# Patient Record
Sex: FEMALE | Race: WHITE | NOT HISPANIC OR LATINO | Employment: UNEMPLOYED | URBAN - METROPOLITAN AREA
[De-identification: names, ages, dates, MRNs, and addresses within clinical notes are randomized per-mention and may not be internally consistent; named-entity substitution may affect disease eponyms.]

---

## 2017-04-05 ENCOUNTER — LAB VISIT (OUTPATIENT)
Dept: LAB | Facility: HOSPITAL | Age: 22
End: 2017-04-05
Attending: OBSTETRICS & GYNECOLOGY
Payer: MEDICAID

## 2017-04-05 ENCOUNTER — TELEPHONE (OUTPATIENT)
Dept: OBSTETRICS AND GYNECOLOGY | Facility: CLINIC | Age: 22
End: 2017-04-05

## 2017-04-05 ENCOUNTER — INITIAL PRENATAL (OUTPATIENT)
Dept: OBSTETRICS AND GYNECOLOGY | Facility: CLINIC | Age: 22
End: 2017-04-05
Payer: MEDICAID

## 2017-04-05 VITALS — WEIGHT: 142.44 LBS | DIASTOLIC BLOOD PRESSURE: 62 MMHG | HEART RATE: 60 BPM | SYSTOLIC BLOOD PRESSURE: 115 MMHG

## 2017-04-05 DIAGNOSIS — Z34.91 PREGNANCY WITH ONE FETUS, FIRST TRIMESTER: Primary | ICD-10-CM

## 2017-04-05 DIAGNOSIS — Z34.91 PREGNANCY WITH ONE FETUS, FIRST TRIMESTER: ICD-10-CM

## 2017-04-05 DIAGNOSIS — Z36.89 ENCOUNTER TO ESTABLISH GESTATIONAL AGE USING ULTRASOUND: ICD-10-CM

## 2017-04-05 LAB
ABO + RH BLD: NORMAL
ALBUMIN SERPL BCP-MCNC: 3.9 G/DL
ALP SERPL-CCNC: 63 U/L
ALT SERPL W/O P-5'-P-CCNC: 21 U/L
AMPHET+METHAMPHET UR QL: NEGATIVE
ANION GAP SERPL CALC-SCNC: 7 MMOL/L
AST SERPL-CCNC: 15 U/L
BARBITURATES UR QL SCN>200 NG/ML: NEGATIVE
BASOPHILS # BLD AUTO: 0.02 K/UL
BASOPHILS NFR BLD: 0.2 %
BENZODIAZ UR QL SCN>200 NG/ML: NEGATIVE
BILIRUB SERPL-MCNC: 0.3 MG/DL
BLD GP AB SCN CELLS X3 SERPL QL: NORMAL
BUN SERPL-MCNC: 8 MG/DL
BZE UR QL SCN: NEGATIVE
CALCIUM SERPL-MCNC: 9.3 MG/DL
CANNABINOIDS UR QL SCN: NEGATIVE
CHLORIDE SERPL-SCNC: 105 MMOL/L
CO2 SERPL-SCNC: 26 MMOL/L
CREAT SERPL-MCNC: 0.8 MG/DL
CREAT UR-MCNC: 116.5 MG/DL
DIFFERENTIAL METHOD: NORMAL
EOSINOPHIL # BLD AUTO: 0.2 K/UL
EOSINOPHIL NFR BLD: 1.8 %
ERYTHROCYTE [DISTWIDTH] IN BLOOD BY AUTOMATED COUNT: 13.4 %
EST. GFR  (AFRICAN AMERICAN): >60 ML/MIN/1.73 M^2
EST. GFR  (NON AFRICAN AMERICAN): >60 ML/MIN/1.73 M^2
GLUCOSE SERPL-MCNC: 86 MG/DL
HCT VFR BLD AUTO: 38.6 %
HGB BLD-MCNC: 12.7 G/DL
LYMPHOCYTES # BLD AUTO: 2.1 K/UL
LYMPHOCYTES NFR BLD: 23.3 %
MCH RBC QN AUTO: 29.1 PG
MCHC RBC AUTO-ENTMCNC: 32.9 %
MCV RBC AUTO: 89 FL
METHADONE UR QL SCN>300 NG/ML: NEGATIVE
MONOCYTES # BLD AUTO: 0.5 K/UL
MONOCYTES NFR BLD: 5.9 %
NEUTROPHILS # BLD AUTO: 6.1 K/UL
NEUTROPHILS NFR BLD: 68.8 %
OPIATES UR QL SCN: NEGATIVE
PCP UR QL SCN>25 NG/ML: NEGATIVE
PLATELET # BLD AUTO: 276 K/UL
PMV BLD AUTO: 9.8 FL
POTASSIUM SERPL-SCNC: 3.8 MMOL/L
PROT SERPL-MCNC: 7.6 G/DL
RBC # BLD AUTO: 4.36 M/UL
SODIUM SERPL-SCNC: 138 MMOL/L
TOXICOLOGY INFORMATION: NORMAL
WBC # BLD AUTO: 8.84 K/UL

## 2017-04-05 PROCEDURE — 81220 CFTR GENE COM VARIANTS: CPT

## 2017-04-05 PROCEDURE — 85025 COMPLETE CBC W/AUTO DIFF WBC: CPT

## 2017-04-05 PROCEDURE — 99999 PR PBB SHADOW E&M-NEW PATIENT-LVL III: CPT | Mod: PBBFAC,,, | Performed by: OBSTETRICS & GYNECOLOGY

## 2017-04-05 PROCEDURE — 86803 HEPATITIS C AB TEST: CPT

## 2017-04-05 PROCEDURE — 83036 HEMOGLOBIN GLYCOSYLATED A1C: CPT

## 2017-04-05 PROCEDURE — 76801 OB US < 14 WKS SINGLE FETUS: CPT | Mod: 26,S$PBB,, | Performed by: OBSTETRICS & GYNECOLOGY

## 2017-04-05 PROCEDURE — 99204 OFFICE O/P NEW MOD 45 MIN: CPT | Mod: TH,25,S$PBB, | Performed by: OBSTETRICS & GYNECOLOGY

## 2017-04-05 PROCEDURE — 86762 RUBELLA ANTIBODY: CPT

## 2017-04-05 PROCEDURE — 86900 BLOOD TYPING SEROLOGIC ABO: CPT

## 2017-04-05 PROCEDURE — 86850 RBC ANTIBODY SCREEN: CPT

## 2017-04-05 PROCEDURE — 36415 COLL VENOUS BLD VENIPUNCTURE: CPT

## 2017-04-05 PROCEDURE — 86703 HIV-1/HIV-2 1 RESULT ANTBDY: CPT

## 2017-04-05 PROCEDURE — 80053 COMPREHEN METABOLIC PANEL: CPT

## 2017-04-05 PROCEDURE — 87340 HEPATITIS B SURFACE AG IA: CPT

## 2017-04-05 PROCEDURE — 86592 SYPHILIS TEST NON-TREP QUAL: CPT

## 2017-04-05 NOTE — MR AVS SNAPSHOT
[facility] [place] [facility] - OB/ GYN  [address]  [place] [address]  Phone: [phone]                  Iliana Ghotra   2017 11:10 AM   Initial Prenatal    Description:  Female : 1995   Provider:  Andriy Bhatt MD   Department:  [facility] - OB/ GYN           Reason for Visit     Initial Prenatal Visit           Diagnoses this Visit        Comments    Encounter to establish gestational age using ultrasound    -  Primary            To Do List           Goals (5 Years of Data)     None      [facility]     [facility] On Call Nurse Care Line -  Assistance  Unless otherwise directed by your provider, please contact [facility], our nurse care line that is available for  assistance.     Registered nurses in the [facility] provide: appointment scheduling, clinical advisement, health education, and other advisory services.  Call: [phone] (toll free)               Medications           Message regarding Medications     Verify the changes and/or additions to your medication regime listed below are the same as discussed with your clinician today.  If any of these changes or additions are incorrect, please notify your healthcare provider.             Verify that the below list of medications is an accurate representation of the medications you are currently taking.  If none reported, the list may be blank. If incorrect, please contact your healthcare provider. Carry this list with you in case of emergency.                Clinical Reference Information           Prenatal Vitals     Enc. Date GA Prenatal Vitals Prenatal Pulse Pain Level Urine Albumin/Glucose Edema Presentation Dilation/Effacement/Station    17  115/62 / 64.6 kg (142 lb 6.7 oz)    Trace / Negative         Your Vitals Were     BP Weight                115/62 64.6 kg (142 lb 6.7 oz)          Allergies as of 2017     No Known Allergies      Immunizations Administered on Date of Encounter - 2017      None      [facility] Sign-Up     Activating your [facility] account is as easy as 1-2-3!     1) Visit [email or web address], select Sign Up Now, enter this activation code and your date of birth, then select Next.  [email or web address]  Expires: 5/20/2017 11:47 AM      2) Create a username and password to use when you visit [facility] in the future and select a security question in case you lose your password and select Next.    3) Enter your e-mail address and click Sign Up!    Additional Information  If you have questions, please e-mail [email or web address] or call [phone] to talk to our [facility] staff. Remember, [facility] is NOT to be used for urgent needs. For medical emergencies, dial 911.         Smoking Cessation     If you would like to quit smoking:   You may be eligible for free services if you are a [place] resident and started smoking cigarettes before September 1, 1988.  Call the Smoking Cessation Trust ([facility]) toll free at [phone] or ([phone] [phone].   Call 8-595-QUIT-NOW if you do not meet the above criteria.   Contact us via email: [email or web address]   View our website for more information: [email or web address]        Language Assistance Services     ATTENTION: Language assistance services are available, free of charge. Please call [phone].      ATENCIÓN: Si habla español, tiene a briscoe disposición servicios gratuitos de asistencia lingüística. Llame al [phone].     [facility] Ý: N?u b?n nói Ti?ng Vi?t, có các d?ch v? h? tr? ngôn ng? mi?n phí dành cho b?n. G?i s? [phone].         [facility] - OB/ GYN complies with applicable Federal civil rights laws and does not discriminate on the basis of race, color, national origin, age, disability, or sex.

## 2017-04-05 NOTE — TELEPHONE ENCOUNTER
----- Message from Michelle Arreaga sent at 4/5/2017  3:40 PM CDT -----  Contact: [phone]  Pt is requesting a call back in regards to a personal matter Please call pt at your earliest convenience.  Thanks  -----------------------------------  4/5/17 @ 1628p  CALL ATTEMPT TO PT UNSUCCESSFUL, MESSAGE LEFT FOR PT TO RETURN CALL TO OFFICE .

## 2017-04-06 ENCOUNTER — TELEPHONE (OUTPATIENT)
Dept: OBSTETRICS AND GYNECOLOGY | Facility: CLINIC | Age: 22
End: 2017-04-06

## 2017-04-06 DIAGNOSIS — A74.9 CHLAMYDIA: Primary | ICD-10-CM

## 2017-04-06 LAB
C TRACH DNA SPEC QL NAA+PROBE: DETECTED
ESTIMATED AVG GLUCOSE: 103 MG/DL
HBA1C MFR BLD HPLC: 5.2 %
HBV SURFACE AG SERPL QL IA: NEGATIVE
HCV AB SERPL QL IA: NEGATIVE
HIV 1+2 AB+HIV1 P24 AG SERPL QL IA: NEGATIVE
N GONORRHOEA DNA SPEC QL NAA+PROBE: NOT DETECTED
RPR SER QL: NORMAL

## 2017-04-06 RX ORDER — AZITHROMYCIN 500 MG/1
1000 TABLET, FILM COATED ORAL ONCE
Qty: 4 TABLET | Refills: 0 | Status: SHIPPED | OUTPATIENT
Start: 2017-04-06 | End: 2017-04-06

## 2017-04-06 NOTE — TELEPHONE ENCOUNTER
Pt wanted to know if there was a paternity test that we knew of that she could take while pregnant. Pt advised that there is one that may be offered but most insurances does not cover it. It is also a test that we do not offer here in OB.

## 2017-04-06 NOTE — PROGRESS NOTES
[facility]  Ambulatory Clinic  Obstetrics & Gynecology    Visit Date:  2017     Chief Complaint:  Establish prenatal care    Dating Criteria:     LMP:  2017  CRISTIAN by LMP:  10/30/2017  CRISTIAN by 8w6d u/s on 2017:  2017     Working CRISTIAN:  2017, by Ultrasound    History of Present Illness:      Iliana Ghotra is a 22 y.o.  at 8w6d gestation by today's u/s here to establish prenatal care.     Pt recent moved to [facility] from [place], [place].    Pt has no major complaints today and denies any vaginal bleeding, leakage of fluid, contractions, or pain.    Pt is in her usual state of health.    Past Medical History:      None      Past Surgical History:     None     Medications:     Prenatal vitamins    Allergies:      NKDA      Obstetric History:      G1, current    Gynecologic History:      Denies recent/active STI  Denies history abnormal Pap     Social History:      Denies tobacco, alcohol or illicit drug use  Current partner is father of baby  Denies domestic abuse     Family History:       Denies congenital anomalies, inherited syndromes, fetal aneuploidy    Review of Systems:      Constitutional:  No fever, fatigue  HENT:  No congestion, hearing changes  Eyes:  No visual disturbance  Respiratory:  No cough, shortness of breath  Cardiovascular:  No chest pain, leg swelling  Breast:  No lump, pain, nipple discharge, redness, skin changes  Gastrointestinal:  No abdominal pain, constipation, blood in stool   Genitourinary:  No dysuria, frequency  Endocrine:  No heat or cold intolerance  Musculoskeletal:  No back pain, arthralgias  Skin:  No rash, jaundice  Neurological:  No dizziness, weakness, headaches  Psychiatric/Behavioral:  No sleep disturbance, dysphoric mood     Physical Exam:     /62  Wt 64.6 kg (142 lb 6.7 oz)  LMP 2017  Pulse 60, Resp rate 16     GENERAL:  NAD. Well-nourished. A&Ox3.  HEENT:  NCAT, EOMI, PERRLA, moist mucus membranes.  Neck supple w/o  masses.  BREAST:  Symmetric, no obvious masses, adenopathy, skin changes or nipple discharge.  LUNGS:  CTA-B.  HEART:  RRR, physiologic heart sounds.  ABDOMEN:  Soft, non-tender. Normoactive BS.  No obvious organomegaly.  Size = dates.    EXT:  Symmetric w/o cramping, claudication, or edema. +2 distal pulses. FROM.  SKIN:  No rashes or bruising.  NEURO:  CN II - XII grossly intact bilaterally. +2 DTR.  PSYCH:  Mood & affect appropriate.       PELVIC:  Female external genitalia w/o any obvious lesions.  Adequate perineal body. Normal urethral meatus. No gross lymphadenopathy.     VAGINA:  Pink, moist, well-rugated.  Good support.  No obvious lesion.  No discharge noted.     CERVIX:  No cervical motion tenderness, discharge, or obvious lesions.  Closed, thick, posterior.  UTERUS:  Small, non-tender, normal contour.  ADNEXA:  No masses or tenderness.    RECTAL:  Declined.  No obvious external lesions.   WET PREP:  Negative    Chaperone present for exam.    Assessment:     22 y.o.  at 8w6d by today's u/s here to establish prenatal care    Plan:    Principles of prenatal care, weight gain goals, dietary/lifestyle modifications, pregnancy care instructions and precautions were discussed in detail.  Pt was provided literature regarding pregnancy, maternity care, and childbirth.  Continue prenatal vitamins.  SAB precautions reviewed.    Initial OB labs today    Dating ultrasound today    We discussed first trimester aneuploidy screening options, pt declined and states she would not terminate the pregnancy for any reasons.    Return in 4 weeks, or sooner as needed.  Go to ED for any emergencies.  All questions answered, pt voiced understanding.      Andriy Bhatt MD

## 2017-04-06 NOTE — TELEPHONE ENCOUNTER
----- Message from Yasmine Olivera sent at 4/6/2017 11:43 AM CDT -----  Contact: self  Pt calling to discuss a personal matter. Please call [phone].

## 2017-04-06 NOTE — TELEPHONE ENCOUNTER
Notes Recorded by Ernestina Burns LPN on 4/6/2017 at 4:32 PM  CALL ATTEMPT TO PT'S HOME PHONE & CELL PHONE UNSUCCESSFUL, MESSAGE LEFT FOR PT TO RETURN CALL TO OFFICE CONCERNING HER RECENT LAB TEST  -------------------------------

## 2017-04-07 DIAGNOSIS — O99.891 ASYMPTOMATIC BACTERIURIA DURING PREGNANCY: Primary | ICD-10-CM

## 2017-04-07 DIAGNOSIS — R82.71 ASYMPTOMATIC BACTERIURIA DURING PREGNANCY: Primary | ICD-10-CM

## 2017-04-07 LAB
BACTERIA UR CULT: NORMAL
BACTERIA UR CULT: NORMAL
RUBV IGG SER-ACNC: 20.7 IU/ML
RUBV IGG SER-IMP: REACTIVE

## 2017-04-07 RX ORDER — NITROFURANTOIN 25; 75 MG/1; MG/1
100 CAPSULE ORAL 2 TIMES DAILY
Qty: 14 CAPSULE | Refills: 0 | Status: SHIPPED | OUTPATIENT
Start: 2017-04-07 | End: 2017-04-14

## 2017-04-07 NOTE — TELEPHONE ENCOUNTER
Notes Recorded by Ernestina Burns LPN on 4/7/2017 at 2:32 PM  SPOKE WITH RAMIRO , INFORMED HER THAT DR MONSON SENT IN MACROBID TO HER PHARMACY FOR A UTI , INSTRUCTED HER TO START TAKING THEM AS SOON AS SHE GET IT AND NOT TO MISS A DOSE, INSTRUCTED PT TO WIPE FROM FRONT TO BACK AND TO ALWAYS WASH HANDS BEFORE AND AFTER USING THE BATHROOM  PT STATED HER UNDERSTANDING

## 2017-04-07 NOTE — TELEPHONE ENCOUNTER
Notes Recorded by Ernestina Burns LPN on 4/7/2017 at 1:17 PM  SPOKE WITH PT, SHE RETURNED CALL TO CLINIC , INFORMED HER THAT DR MONSON RECEIVED HER LAB RESULTS AND IT IS POSITIVE FOR CHLAMYDIA AN STD, INFORMED HER THAT HE HAS SENT AZITHROMYCIN TO HER PHARMACY [facility], ,AND SHE NEEDS TO START THE MEDICATION ASAP,  INFORMED HER THAT SHE NEEDS TO ABSTAIN FROM SEX WITH HER PARTNER AND HE NEEDS TO  GET TESTED AND TREATED ASAP BECAUSE SHE CAN BE REINFECTED AND IT CAN CAUSE HARM TO THE BABY..  ALSO FOLLOW UP APPT MADE FOR 5/5/17 @ 1110AM , TO TEST FOR A CURE, INFORMED OF THE IMPORTANCE OF COMPLYING TO THIS PROCESS TO PROTECT HER AND THE BABY, PT STATED SHE UNDERSTANDS BUT SOUND OF UNCERTAINTY NOTED  ------

## 2017-04-07 NOTE — TELEPHONE ENCOUNTER
----- Message from Татьяна Espinal sent at 4/7/2017  1:47 PM CDT -----  Contact: self  Pt is requesting a rx for yeast following antibiotic treatment [phone]        Thanks

## 2017-04-12 ENCOUNTER — TELEPHONE (OUTPATIENT)
Dept: OBSTETRICS AND GYNECOLOGY | Facility: CLINIC | Age: 22
End: 2017-04-12

## 2017-04-12 NOTE — TELEPHONE ENCOUNTER
----- Message from Corin Downs sent at 4/12/2017  3:42 PM CDT -----  Hey this is Corin from the send out lab. I'm contacting you about the cystic fibrosis mutation panel that was positive. Please reply back confirming you have reviewed this message. Thanks.  ----------------------------------------------------  4/12/17 @ 1618P    CORIN FROM THE LAB STATED SHE SENT , RESULTS OF THE CYSTIC FIBROSIS PANEL,   NOTED PANEL IS RECEIVED AND IN THE PT'S CHART, WILL FORWARD TO DR MONSON FOR EVALUATION.

## 2017-04-13 LAB — CFTR MUT ANL BLD/T: ABNORMAL

## 2017-04-17 ENCOUNTER — TELEPHONE (OUTPATIENT)
Dept: OBSTETRICS AND GYNECOLOGY | Facility: CLINIC | Age: 22
End: 2017-04-17

## 2017-04-17 NOTE — TELEPHONE ENCOUNTER
----- Message from Michelle Arreaga sent at 4/17/2017 11:39 AM CDT -----  Contact: [phone]  Please call pt at the [phone] Please call pt at your earliest convenience.Thanks !

## 2017-04-17 NOTE — TELEPHONE ENCOUNTER
----- Message from аТтьяна Espinal sent at 4/17/2017  9:13 AM CDT -----  Contact: self  Pt is requesting a rx to treat partner for pos chlamydia. Please call pt with advice @ [phone]          Thanks  -----------------------------------------------------------  4/17/17 @ 1035am  CALL ATTEMPT UNSUCCESSFUL, MESSAGE LEFT FOR PT TO RETURN CALL OFFICE CONCERNING HER PARTNER

## 2017-04-17 NOTE — TELEPHONE ENCOUNTER
Spoke with pt. Pt was recently told she has an STD and wanted rx sent out for her partner as well. Pt informed that he would need to be given rx by his own physician. Pt advised to [facility] or any walk in health clinic.

## 2017-05-03 ENCOUNTER — ROUTINE PRENATAL (OUTPATIENT)
Dept: OBSTETRICS AND GYNECOLOGY | Facility: CLINIC | Age: 22
End: 2017-05-03
Payer: MEDICAID

## 2017-05-03 VITALS — WEIGHT: 143.31 LBS | SYSTOLIC BLOOD PRESSURE: 114 MMHG | DIASTOLIC BLOOD PRESSURE: 68 MMHG

## 2017-05-03 DIAGNOSIS — Z34.91 PREGNANCY WITH ONE FETUS, FIRST TRIMESTER: Primary | ICD-10-CM

## 2017-05-03 DIAGNOSIS — G43.009 MIGRAINE WITHOUT AURA AND WITHOUT STATUS MIGRAINOSUS, NOT INTRACTABLE: ICD-10-CM

## 2017-05-03 PROCEDURE — 99212 OFFICE O/P EST SF 10 MIN: CPT | Mod: PBBFAC | Performed by: OBSTETRICS & GYNECOLOGY

## 2017-05-03 PROCEDURE — 99999 PR PBB SHADOW E&M-EST. PATIENT-LVL II: CPT | Mod: PBBFAC,,, | Performed by: OBSTETRICS & GYNECOLOGY

## 2017-05-03 PROCEDURE — 99213 OFFICE O/P EST LOW 20 MIN: CPT | Mod: TH,S$PBB,, | Performed by: OBSTETRICS & GYNECOLOGY

## 2017-05-03 PROCEDURE — 87591 N.GONORRHOEAE DNA AMP PROB: CPT

## 2017-05-03 RX ORDER — BUTALBITAL, ACETAMINOPHEN AND CAFFEINE 50; 325; 40 MG/1; MG/1; MG/1
1 TABLET ORAL EVERY 4 HOURS PRN
Qty: 30 TABLET | Refills: 0 | Status: ON HOLD | OUTPATIENT
Start: 2017-05-03 | End: 2017-06-01

## 2017-05-03 NOTE — PROGRESS NOTES
12w6d here for OB visit.  Pt has no major complaint today.  Pt reports h/o migraine headaches.  Supportive care measures for HA discussed.  Pt requesting trial of Fioricet just in case if tylenol does not work.  Risks, benefits, and alternatives to Fioricet reviewed.  Headache/neuro precautions.  Pt was treated for chlamydia since her last visit.  Gonorrhea/chlamydia test of cure today. Safe sex.  Pt is CF carrier.  Father of baby denies family h/o CF and is of latin ethnicity, encourage paternal CF testing.  Pt declined first trimester aneuploidy screening, and state she would not terminate the pregnancy for any reasons.  Principles of prenatal care and precautions reviewed.  Return 4 wks.  Voiced understanding.

## 2017-05-03 NOTE — MR AVS SNAPSHOT
[facility] [place] - OB/ GYN  [address]  [place] [address]  Phone: [phone]                  Iliana Ghotra   5/3/2017 10:30 AM   Routine Prenatal    Description:  Female : 1995   Provider:  Andriy Bhatt MD   Department:  [facility] - OB/ GYN           Reason for Visit     Routine Prenatal Visit                To Do List           Future Appointments        Provider Department Dept Phone    2017 10:30 AM Andriy Bhatt MD [facility] - OB/ -[phone]      Goals (5 Years of Data)     None      [facility]     [facility] On Call Nurse Care Line -  Assistance  Unless otherwise directed by your provider, please contact [facility], our nurse care line that is available for  assistance.     Registered nurses in the [facility] provide: appointment scheduling, clinical advisement, health education, and other advisory services.  Call: [phone] (toll free)               Medications           Message regarding Medications     Verify the changes and/or additions to your medication regime listed below are the same as discussed with your clinician today.  If any of these changes or additions are incorrect, please notify your healthcare provider.             Verify that the below list of medications is an accurate representation of the medications you are currently taking.  If none reported, the list may be blank. If incorrect, please contact your healthcare provider. Carry this list with you in case of emergency.                Clinical Reference Information           Prenatal Vitals     Enc. Date GA Prenatal Vitals Prenatal Pulse Pain Level Urine Albumin/Glucose Edema Presentation Dilation/Effacement/Station    5/3/17 12w6d 114/68 / 65 kg (143 lb 4.8 oz)    Negative / Negative       17 8w6d 115/62 / 64.6 kg (142 lb 6.7 oz)  / 187 60 0 Trace / Negative None / None  0 / 0      Your Vitals Were     BP Weight Last Period             114 65 kg (143 lb 4.8 oz)  01/23/2017         Allergies as of 5/3/2017     No Known Allergies      Immunizations Administered on Date of Encounter - 5/3/2017     None      [facility] Sign-Up     Activating your [facility] account is as easy as 1-2-3!     1) Visit [email or web address], select Sign Up Now, enter this activation code and your date of birth, then select Next.  [email or web address]  Expires: 5/20/2017 11:47 AM      2) Create a username and password to use when you visit [facility] in the future and select a security question in case you lose your password and select Next.    3) Enter your e-mail address and click Sign Up!    Additional Information  If you have questions, please e-mail [email or web address] or call [phone] to talk to our [facility] staff. Remember, [facility] is NOT to be used for urgent needs. For medical emergencies, dial 911.         Smoking Cessation     If you would like to quit smoking:   You may be eligible for free services if you are a [place] resident and started smoking cigarettes before September 1, 1988.  Call the Smoking Cessation Trust ([facility]) toll free at ([phone] 679-4746 or (927) 611-6981.   Call 6-847-QUIT-NOW if you do not meet the above criteria.   Contact us via email: [email or web address]   View our website for more information: [email or web address]        Language Assistance Services     ATTENTION: Language assistance services are available, free of charge. Please call [phone].      ATENCIÓN: Si habla español, tiene a briscoe disposición servicios gratuitos de asistencia lingüística. Llame al [phone].     CHÚ Ý: N?u b?n nói Ti?ng Vi?t, có các d?ch v? h? tr? ngôn ng? mi?n phí dành cho b?n. G?i s? [phone].         [facility] - OB/ GYN complies with applicable Federal civil rights laws and does not discriminate on the basis of race, color, national origin, age, disability, or sex.

## 2017-05-04 LAB
C TRACH DNA SPEC QL NAA+PROBE: NOT DETECTED
N GONORRHOEA DNA SPEC QL NAA+PROBE: NOT DETECTED

## 2017-05-31 ENCOUNTER — ROUTINE PRENATAL (OUTPATIENT)
Dept: OBSTETRICS AND GYNECOLOGY | Facility: CLINIC | Age: 22
DRG: 779 | End: 2017-05-31
Payer: MEDICAID

## 2017-05-31 ENCOUNTER — HOSPITAL ENCOUNTER (INPATIENT)
Facility: HOSPITAL | Age: 22
LOS: 2 days | Discharge: HOME | DRG: 779 | End: 2017-06-02
Attending: OBSTETRICS & GYNECOLOGY | Admitting: OBSTETRICS & GYNECOLOGY
Payer: MEDICAID

## 2017-05-31 VITALS — WEIGHT: 144.19 LBS | DIASTOLIC BLOOD PRESSURE: 64 MMHG | SYSTOLIC BLOOD PRESSURE: 106 MMHG

## 2017-05-31 DIAGNOSIS — O02.1 FETAL DEMISE BEFORE 20 WEEKS WITH RETENTION OF DEAD FETUS: Primary | ICD-10-CM

## 2017-05-31 DIAGNOSIS — O02.1 FETAL DEMISE BEFORE 20 WEEKS WITH RETENTION OF DEAD FETUS: ICD-10-CM

## 2017-05-31 LAB
ABO + RH BLD: NORMAL
BASOPHILS # BLD AUTO: 0.01 K/UL
BASOPHILS NFR BLD: 0.1 %
BLD GP AB SCN CELLS X3 SERPL QL: NORMAL
DIFFERENTIAL METHOD: ABNORMAL
EOSINOPHIL # BLD AUTO: 0.1 K/UL
EOSINOPHIL NFR BLD: 0.8 %
ERYTHROCYTE [DISTWIDTH] IN BLOOD BY AUTOMATED COUNT: 13.7 %
HCT VFR BLD AUTO: 38.1 %
HGB BLD-MCNC: 13.2 G/DL
LYMPHOCYTES # BLD AUTO: 1.7 K/UL
LYMPHOCYTES NFR BLD: 14.5 %
MCH RBC QN AUTO: 30.5 PG
MCHC RBC AUTO-ENTMCNC: 34.6 %
MCV RBC AUTO: 88 FL
MONOCYTES # BLD AUTO: 0.7 K/UL
MONOCYTES NFR BLD: 6.5 %
NEUTROPHILS # BLD AUTO: 8.9 K/UL
NEUTROPHILS NFR BLD: 78.1 %
PLATELET # BLD AUTO: 264 K/UL
PMV BLD AUTO: 10.6 FL
RBC # BLD AUTO: 4.33 M/UL
WBC # BLD AUTO: 11.36 K/UL

## 2017-05-31 PROCEDURE — 99213 OFFICE O/P EST LOW 20 MIN: CPT | Mod: TH,S$PBB,, | Performed by: OBSTETRICS & GYNECOLOGY

## 2017-05-31 PROCEDURE — 86901 BLOOD TYPING SEROLOGIC RH(D): CPT

## 2017-05-31 PROCEDURE — 85025 COMPLETE CBC W/AUTO DIFF WBC: CPT

## 2017-05-31 PROCEDURE — 63600175 PHARM REV CODE 636 W HCPCS: Performed by: OBSTETRICS & GYNECOLOGY

## 2017-05-31 PROCEDURE — 11000001 HC ACUTE MED/SURG PRIVATE ROOM

## 2017-05-31 PROCEDURE — 72100002 HC LABOR CARE, 1ST 8 HOURS

## 2017-05-31 PROCEDURE — 25000003 PHARM REV CODE 250: Performed by: OBSTETRICS & GYNECOLOGY

## 2017-05-31 PROCEDURE — 99999 PR PBB SHADOW E&M-EST. PATIENT-LVL II: CPT | Mod: PBBFAC,,, | Performed by: OBSTETRICS & GYNECOLOGY

## 2017-05-31 PROCEDURE — 36415 COLL VENOUS BLD VENIPUNCTURE: CPT

## 2017-05-31 PROCEDURE — 63600175 PHARM REV CODE 636 W HCPCS: Performed by: ANESTHESIOLOGY

## 2017-05-31 PROCEDURE — 86900 BLOOD TYPING SEROLOGIC ABO: CPT

## 2017-05-31 RX ORDER — KETOROLAC TROMETHAMINE 30 MG/ML
30 INJECTION, SOLUTION INTRAMUSCULAR; INTRAVENOUS EVERY 6 HOURS
Status: CANCELLED | OUTPATIENT
Start: 2017-05-31 | End: 2017-06-01

## 2017-05-31 RX ORDER — MISOPROSTOL 100 UG/1
600 TABLET ORAL
Status: CANCELLED | OUTPATIENT
Start: 2017-05-31

## 2017-05-31 RX ORDER — SODIUM CHLORIDE, SODIUM LACTATE, POTASSIUM CHLORIDE, CALCIUM CHLORIDE 600; 310; 30; 20 MG/100ML; MG/100ML; MG/100ML; MG/100ML
INJECTION, SOLUTION INTRAVENOUS CONTINUOUS
Status: DISCONTINUED | OUTPATIENT
Start: 2017-05-31 | End: 2017-06-01

## 2017-05-31 RX ORDER — HYDROMORPHONE HCL IN 0.9% NACL 6 MG/30 ML
PATIENT CONTROLLED ANALGESIA SYRINGE INTRAVENOUS CONTINUOUS
Status: DISCONTINUED | OUTPATIENT
Start: 2017-05-31 | End: 2017-06-01

## 2017-05-31 RX ORDER — IBUPROFEN 400 MG/1
800 TABLET ORAL EVERY 8 HOURS
Status: DISCONTINUED | OUTPATIENT
Start: 2017-06-01 | End: 2017-05-31

## 2017-05-31 RX ORDER — KETOROLAC TROMETHAMINE 30 MG/ML
30 INJECTION, SOLUTION INTRAMUSCULAR; INTRAVENOUS EVERY 6 HOURS
Status: DISCONTINUED | OUTPATIENT
Start: 2017-05-31 | End: 2017-05-31

## 2017-05-31 RX ORDER — NALOXONE HCL 0.4 MG/ML
0.02 VIAL (ML) INJECTION
Status: DISCONTINUED | OUTPATIENT
Start: 2017-05-31 | End: 2017-06-01

## 2017-05-31 RX ORDER — SODIUM CHLORIDE, SODIUM LACTATE, POTASSIUM CHLORIDE, CALCIUM CHLORIDE 600; 310; 30; 20 MG/100ML; MG/100ML; MG/100ML; MG/100ML
INJECTION, SOLUTION INTRAVENOUS CONTINUOUS
Status: CANCELLED | OUTPATIENT
Start: 2017-05-31

## 2017-05-31 RX ORDER — MISOPROSTOL 200 UG/1
600 TABLET ORAL
Status: DISCONTINUED | OUTPATIENT
Start: 2017-05-31 | End: 2017-06-01

## 2017-05-31 RX ORDER — ONDANSETRON 4 MG/1
8 TABLET, ORALLY DISINTEGRATING ORAL EVERY 8 HOURS PRN
Status: CANCELLED | OUTPATIENT
Start: 2017-05-31

## 2017-05-31 RX ORDER — HYDROMORPHONE HCL IN 0.9% NACL 6 MG/30 ML
PATIENT CONTROLLED ANALGESIA SYRINGE INTRAVENOUS CONTINUOUS
Status: DISCONTINUED | OUTPATIENT
Start: 2017-05-31 | End: 2017-05-31

## 2017-05-31 RX ORDER — MISOPROSTOL 100 UG/1
400 TABLET ORAL EVERY 4 HOURS PRN
Status: CANCELLED | OUTPATIENT
Start: 2017-05-31

## 2017-05-31 RX ORDER — ONDANSETRON 8 MG/1
8 TABLET, ORALLY DISINTEGRATING ORAL EVERY 8 HOURS PRN
Status: DISCONTINUED | OUTPATIENT
Start: 2017-05-31 | End: 2017-06-01

## 2017-05-31 RX ORDER — SODIUM CHLORIDE 0.9 % (FLUSH) 0.9 %
3 SYRINGE (ML) INJECTION
Status: DISCONTINUED | OUTPATIENT
Start: 2017-05-31 | End: 2017-06-01

## 2017-05-31 RX ORDER — HYDROMORPHONE HYDROCHLORIDE 2 MG/ML
0.2 INJECTION, SOLUTION INTRAMUSCULAR; INTRAVENOUS; SUBCUTANEOUS EVERY 5 MIN PRN
Status: DISCONTINUED | OUTPATIENT
Start: 2017-05-31 | End: 2017-05-31

## 2017-05-31 RX ORDER — IBUPROFEN 200 MG
800 TABLET ORAL EVERY 8 HOURS
Status: CANCELLED | OUTPATIENT
Start: 2017-06-01

## 2017-05-31 RX ORDER — MISOPROSTOL 200 UG/1
400 TABLET ORAL EVERY 4 HOURS PRN
Status: DISCONTINUED | OUTPATIENT
Start: 2017-05-31 | End: 2017-06-01

## 2017-05-31 RX ORDER — NALOXONE HCL 0.4 MG/ML
0.02 VIAL (ML) INJECTION
Status: DISCONTINUED | OUTPATIENT
Start: 2017-05-31 | End: 2017-05-31

## 2017-05-31 RX ORDER — ACETAMINOPHEN 10 MG/ML
1000 INJECTION, SOLUTION INTRAVENOUS EVERY 8 HOURS
Status: DISCONTINUED | OUTPATIENT
Start: 2017-05-31 | End: 2017-06-01

## 2017-05-31 RX ADMIN — MISOPROSTOL 400 MCG: 200 TABLET ORAL at 11:05

## 2017-05-31 RX ADMIN — SODIUM CHLORIDE, SODIUM LACTATE, POTASSIUM CHLORIDE, AND CALCIUM CHLORIDE: .6; .31; .03; .02 INJECTION, SOLUTION INTRAVENOUS at 06:05

## 2017-05-31 RX ADMIN — MISOPROSTOL 400 MCG: 200 TABLET ORAL at 06:05

## 2017-05-31 RX ADMIN — PROMETHAZINE HYDROCHLORIDE 12.5 MG: 25 INJECTION INTRAMUSCULAR; INTRAVENOUS at 09:05

## 2017-05-31 RX ADMIN — ACETAMINOPHEN 1000 MG: 10 INJECTION, SOLUTION INTRAVENOUS at 09:05

## 2017-05-31 RX ADMIN — ONDANSETRON 8 MG: 8 TABLET, ORALLY DISINTEGRATING ORAL at 11:05

## 2017-05-31 RX ADMIN — Medication: at 09:05

## 2017-05-31 NOTE — NURSING
Bereavement info provided and reviewed.Info provided re.[employer]yt[employer] induction,pain relief options and what to expect at delivery.Asked appropriate questions.Verb.understanding

## 2017-05-31 NOTE — PROGRESS NOTES
16w6d here for OB visit.  Pt has no major complaint today.  Denies vaginal bleeding, leakage of fluid, or contractions.  Unfortunately, FHT was not appreciated on today's exam.  Quick look bedside ultrasound showed absent fetal cardiac activity.  Findings of fetal demised discussed, pt informed.  Emotional supportive provided.  Pt did not report anything out of the ordinary other than she went to [employer] past Friday and participated in rides all day.  After the period of grieving, pt was advised on various mgt options for fetal demise, including expectant mgt, induction of labor, vs surgical mgt.  Pt states she would like to proceed with induction of labor.  Risks, benefits, and alternatives to IOL reviewed.  Will send to L&D for ultrasound and induction of labor.  All questions answered, pt voiced understanding.  Significant other and female friend present at bedside.

## 2017-06-01 PROCEDURE — 88305 TISSUE EXAM BY PATHOLOGIST: CPT | Performed by: PATHOLOGY

## 2017-06-01 PROCEDURE — 72200004 HC VAGINAL DELIVERY LEVEL I

## 2017-06-01 PROCEDURE — 3E033VJ INTRODUCTION OF OTHER HORMONE INTO PERIPHERAL VEIN, PERCUTANEOUS APPROACH: ICD-10-PCS | Performed by: OBSTETRICS & GYNECOLOGY

## 2017-06-01 PROCEDURE — 63600175 PHARM REV CODE 636 W HCPCS: Performed by: OBSTETRICS & GYNECOLOGY

## 2017-06-01 PROCEDURE — 11000001 HC ACUTE MED/SURG PRIVATE ROOM

## 2017-06-01 PROCEDURE — 88309 TISSUE EXAM BY PATHOLOGIST: CPT | Mod: 26,,, | Performed by: PATHOLOGY

## 2017-06-01 PROCEDURE — 63600175 PHARM REV CODE 636 W HCPCS: Performed by: ANESTHESIOLOGY

## 2017-06-01 PROCEDURE — 25000003 PHARM REV CODE 250: Performed by: OBSTETRICS & GYNECOLOGY

## 2017-06-01 PROCEDURE — 88305 TISSUE EXAM BY PATHOLOGIST: CPT | Mod: 26,,, | Performed by: PATHOLOGY

## 2017-06-01 RX ORDER — ONDANSETRON 8 MG/1
8 TABLET, ORALLY DISINTEGRATING ORAL EVERY 8 HOURS PRN
Status: DISCONTINUED | OUTPATIENT
Start: 2017-06-01 | End: 2017-06-02 | Stop reason: HOSPADM

## 2017-06-01 RX ORDER — OXYTOCIN/RINGER'S LACTATE 20/1000 ML
41.65 PLASTIC BAG, INJECTION (ML) INTRAVENOUS CONTINUOUS
Status: DISCONTINUED | OUTPATIENT
Start: 2017-06-01 | End: 2017-06-01

## 2017-06-01 RX ORDER — DIPHENHYDRAMINE HCL 25 MG
25 CAPSULE ORAL EVERY 4 HOURS PRN
Status: DISCONTINUED | OUTPATIENT
Start: 2017-06-01 | End: 2017-06-02 | Stop reason: HOSPADM

## 2017-06-01 RX ORDER — ZOLPIDEM TARTRATE 5 MG/1
5 TABLET ORAL NIGHTLY PRN
Status: DISCONTINUED | OUTPATIENT
Start: 2017-06-01 | End: 2017-06-02 | Stop reason: HOSPADM

## 2017-06-01 RX ORDER — OXYCODONE AND ACETAMINOPHEN 10; 325 MG/1; MG/1
1 TABLET ORAL EVERY 4 HOURS PRN
Status: DISCONTINUED | OUTPATIENT
Start: 2017-06-01 | End: 2017-06-02 | Stop reason: HOSPADM

## 2017-06-01 RX ORDER — SIMETHICONE 80 MG
1 TABLET,CHEWABLE ORAL EVERY 6 HOURS PRN
Status: DISCONTINUED | OUTPATIENT
Start: 2017-06-01 | End: 2017-06-02 | Stop reason: HOSPADM

## 2017-06-01 RX ORDER — OXYCODONE AND ACETAMINOPHEN 5; 325 MG/1; MG/1
1 TABLET ORAL EVERY 4 HOURS PRN
Status: DISCONTINUED | OUTPATIENT
Start: 2017-06-01 | End: 2017-06-02 | Stop reason: HOSPADM

## 2017-06-01 RX ORDER — IBUPROFEN 600 MG/1
600 TABLET ORAL EVERY 6 HOURS PRN
Status: DISCONTINUED | OUTPATIENT
Start: 2017-06-01 | End: 2017-06-02 | Stop reason: HOSPADM

## 2017-06-01 RX ORDER — DOCUSATE SODIUM 100 MG/1
200 CAPSULE, LIQUID FILLED ORAL 2 TIMES DAILY PRN
Status: DISCONTINUED | OUTPATIENT
Start: 2017-06-01 | End: 2017-06-02 | Stop reason: HOSPADM

## 2017-06-01 RX ORDER — OXYTOCIN/RINGER'S LACTATE 20/1000 ML
41.65 PLASTIC BAG, INJECTION (ML) INTRAVENOUS CONTINUOUS
Status: ACTIVE | OUTPATIENT
Start: 2017-06-01 | End: 2017-06-01

## 2017-06-01 RX ORDER — HYDROCORTISONE 25 MG/G
CREAM TOPICAL 3 TIMES DAILY PRN
Status: DISCONTINUED | OUTPATIENT
Start: 2017-06-01 | End: 2017-06-02 | Stop reason: HOSPADM

## 2017-06-01 RX ADMIN — Medication: at 03:06

## 2017-06-01 RX ADMIN — OXYCODONE HYDROCHLORIDE AND ACETAMINOPHEN 1 TABLET: 5; 325 TABLET ORAL at 08:06

## 2017-06-01 RX ADMIN — Medication 41.65 MILLI-UNITS/MIN: at 02:06

## 2017-06-01 RX ADMIN — MISOPROSTOL 400 MCG: 200 TABLET ORAL at 05:06

## 2017-06-01 RX ADMIN — PROMETHAZINE HYDROCHLORIDE 12.5 MG: 25 INJECTION INTRAMUSCULAR; INTRAVENOUS at 05:06

## 2017-06-01 RX ADMIN — MISOPROSTOL 600 MCG: 200 TABLET ORAL at 02:06

## 2017-06-01 RX ADMIN — IBUPROFEN 600 MG: 600 TABLET, FILM COATED ORAL at 07:06

## 2017-06-01 RX ADMIN — SODIUM CHLORIDE, SODIUM LACTATE, POTASSIUM CHLORIDE, AND CALCIUM CHLORIDE: .6; .31; .03; .02 INJECTION, SOLUTION INTRAVENOUS at 01:06

## 2017-06-01 NOTE — PROGRESS NOTES
To room for bedside handoff with FLORESITA Borjas RN, pca pump removed, patient appropriate, spouse at bedside.

## 2017-06-01 NOTE — H&P
[facility]    Obstetrics & Gynecology  History & Physical      Patient Name:  Iliana Ghotra  MRN:  [id number]  Admission Date:  2017  Hospital Length of Stay:  1  Attending Physician:  Andriy Bhatt MD     Date:  2017    Principal Problem:  Fetal demise before 20 weeks    Hospital Course:  IUP at 17w0d for induction of labor secondary to fetal demise    Chief Complaint:  Induction of labor    History of Present Illness:      Iliana Ghotra is a 22 y.o.  at 16w6d who presents to L&D for induction of labor secondary to fetal demise.  Fetal demise was diagnosed in clinic earlier this morning.  Pt has no major complaints and denies vaginal bleeding, leakage of fluid, ctx or pain.  Pt did not report anything out of the ordinary other than she went to [employer] this past  and participated in rides all day.  Otherwise, pt antepartum course has been overall uneventful.  Significant other and family at bedside.     Past Medical History:      None       Past Surgical History:      None      Medications:      Prenatal vitamins     Allergies:      NKDA       Obstetric History:       G1, current     Gynecologic History:      Denies recent/active STI  Denies history abnormal Pap     Social History:       Smokes tobacco  Denies alcohol or illicit drug use  Current partner is father of baby  Denies domestic abuse     Family History:       Denies congenital anomalies, inherited syndromes, fetal aneuploidy    Review of Systems:      At least 10 systems reviewed and were negative except as stated in the HPI     Physical Exam:     Temp:  [98 °F (36.7 °C)-98.8 °F (37.1 °C)] 98.8 °F (37.1 °C)  Pulse:  [] 106  Resp:  [14-18] 18  SpO2:  [92 %-99 %] 99 %  BP: (106-117)/(63-72) 115/63    /63   Pulse 106   Temp 98.8 °F (37.1 °C)   Resp 18   Ht 5' (1.524 m)   Wt 64.9 kg (143 lb)   LMP 2017   SpO2 99%   Breastfeeding? No   BMI 27.93 kg/m²      GENERAL:  No  acute distress.  Alert and oriented to person, place and time.  HEENT:  Normocephalic, atraumatic, anicteric, EOMI, moist mucus membranes.  Neck supple without masses.  LUNGS:  Clear to auscultation bilaterally.  HEART:  Regular rate & rhythm with physiologic heart sounds.  ABDOMEN:  Soft, non tender without any guarding, rigidity or rebound. Normoactive bowel sounds.  PELVIC:  Normal female external genitalia without gross lesions, rashes or excoriations. No gross vaginal or cervical lesions.  Adequate pelvis.  Cervix: Cervix closed, thick, high, posterior.  EXTREMITIES:  Symmetric without cramping, claudication or edema LE. +2 distal pulses.  Full range of motion.  SKIN:  No rashes, good turgor & capillary refill.  NEUROLOGIC:  Grossly intact bilaterally. +2 DTR, symmetric.  PSYCH:  Mood & affect appropriate.       Laboratory Data:     Recent Labs  Lab 17  1308   WBC 11.36   RBC 4.33   HGB 13.2   HCT 38.1      MCV 88   MCH 30.5   MCHC 34.6     ABO:  A POS    Ultrasound 2017    FINDINGS: There is a single intrauterine gestation in cephalic presentation.  The placenta is fundal and grade 1.  There is normal amount of amniotic fluid. Cervix is within normal limits and measures 4.3 cm.      Biophysical profile is as follows:    HR: No Heart rate.    BPD:16 weeks 5 days  HC:  15 weeks 5 day      This corresponds to a composite gestational age of 16 weeks and 2 days.    Assessment:     1. 22 y.o.  at 16w6d for induction of labor secondary to fetal demise    Plan:    Admit to L&D.    Discussed with pt in great detail findings of fetal demise, possible causes, and various management options.  Pt would like to proceed with IOL.  Pt declined fetal testing after delivery.  Emotional support provided.      The patient was informed of the risks, benefits, alternatives and possible complications to induction of labor (with cervidil, cytotec, pitocin, and/or downing bulb), vaginal delivery, operative vaginal  delivery,  section, and blood transfusion.  All questions were answered to pt satisfaction.  Pt voiced understanding and acceptance of these risks.  Informed consents were obtained for delivery, labor induction and blood transfusions.    Admit labs    Consult anesthesia for pain control    Social service and  consult       Andriy Bhatt MD

## 2017-06-01 NOTE — TREATMENT PLAN
Dr Bhatt notified that the placenta still has not delivered, just some clots. Orders to give another 400mcg of cytotec.

## 2017-06-01 NOTE — TREATMENT PLAN
Dr Bhatt notified that fetus delivered, placenta still in.  Orders rec'd for 600 cytotec and pitocin.

## 2017-06-01 NOTE — TREATMENT PLAN
Called Dr Bhatt to discuss plan for pain meds.  If pt does not want epidural, we can do PCA.  Will contact anesthesia about this.

## 2017-06-01 NOTE — PROGRESS NOTES
Dr. Bhatt to room to speak with patient and family, spoke to patient and spouse by themselves..Rationale for autopsy discussed at length. Patient and spouse decided not to have any studies done or any tests. All questions answered.

## 2017-06-01 NOTE — TREATMENT PLAN
RADHA notified.  Spoke with Zaid Peñaloza: Referral #[id number]. Ruled out for all donation due to weight and age.

## 2017-06-01 NOTE — CONSULTS
SW met with pt at bedside. Pt stated that she was doing much better than yesterday. Pt and SO at bedside. SW inquired about bereavement resources. Pt stated that she received resources and thought about Body Donation for infant. SW provided information for donation with [facility] and [facility]. Pt stated that she would like to just have infant viewed and that she would just take [facility]. SW voiced understanding. Pt voiced no concerns or questions. SW will assist as needed.

## 2017-06-01 NOTE — L&D DELIVERY NOTE
[facility]   Delivery Summary  Obstetrics & Gynecology       Date of Delivery:  2017        Preoperative Diagnosis:    Fetal demise at 17w0d  Induction of labor    Postoperative Diagnosis:    SameProcedure Performed:    Vaginal delivery    Indication (HPI):     Please see H&P for complete details.  Iliana Ghotra is a 22 y.o.  at 16w6d who admitted to L&D for induction of labor secondary to fetal demise.  Pt was informed of the risks, benefits, alternatives and possible complications to a vaginal delivery.  Informed consent was obtained for delivery and blood transfusion.      Anesthesia:  PCA    EBL:  150 mL with no replacements    Specimens:  Non-viable fetus, placenta    Findings, Infant & Apgars:      Normal appearing male infant, APGAR 1 min: 0, 5 min: 0, 10 min: 0  Weight 2.8 oz (78 g)  No laceration    Complications:  None    Delivery Summary:      Vaginal delivery of non-viable infant.  No nuchal cord.  No laceration.  Perineum intact.  Placenta was retained 4 hours after delivery, s/p 1000 mcg of cytotec rectal and pitocin, and delivered spontaneously intact.  Hemostasis noted.    No sponges were left in the vagina.   Sponge, lap, needle, and instrument counts were correct time two.   Mother in stable condition.   Findings and expectations were discussed with patient/significant other.  Condolences, emotional support.   All questions answered to pt satisfaction and voiced understanding.      Andriy Bhatt MD

## 2017-06-02 VITALS
HEIGHT: 60 IN | BODY MASS INDEX: 28.07 KG/M2 | RESPIRATION RATE: 14 BRPM | WEIGHT: 143 LBS | SYSTOLIC BLOOD PRESSURE: 90 MMHG | HEART RATE: 70 BPM | TEMPERATURE: 99 F | OXYGEN SATURATION: 100 % | DIASTOLIC BLOOD PRESSURE: 50 MMHG

## 2017-06-02 LAB
BASOPHILS # BLD AUTO: 0.03 K/UL
BASOPHILS NFR BLD: 0.3 %
DIFFERENTIAL METHOD: ABNORMAL
EOSINOPHIL # BLD AUTO: 0.2 K/UL
EOSINOPHIL NFR BLD: 2.2 %
ERYTHROCYTE [DISTWIDTH] IN BLOOD BY AUTOMATED COUNT: 13.5 %
HCT VFR BLD AUTO: 30.5 %
HGB BLD-MCNC: 10.3 G/DL
LYMPHOCYTES # BLD AUTO: 3.3 K/UL
LYMPHOCYTES NFR BLD: 30.2 %
MCH RBC QN AUTO: 30.3 PG
MCHC RBC AUTO-ENTMCNC: 33.8 %
MCV RBC AUTO: 90 FL
MONOCYTES # BLD AUTO: 0.7 K/UL
MONOCYTES NFR BLD: 6.6 %
NEUTROPHILS # BLD AUTO: 6.6 K/UL
NEUTROPHILS NFR BLD: 60.4 %
PLATELET # BLD AUTO: 263 K/UL
PMV BLD AUTO: 10.6 FL
RBC # BLD AUTO: 3.4 M/UL
WBC # BLD AUTO: 10.9 K/UL

## 2017-06-02 PROCEDURE — 85025 COMPLETE CBC W/AUTO DIFF WBC: CPT

## 2017-06-02 PROCEDURE — 36415 COLL VENOUS BLD VENIPUNCTURE: CPT

## 2017-06-02 PROCEDURE — 25000003 PHARM REV CODE 250: Performed by: OBSTETRICS & GYNECOLOGY

## 2017-06-02 RX ORDER — OXYCODONE AND ACETAMINOPHEN 5; 325 MG/1; MG/1
1 TABLET ORAL EVERY 4 HOURS PRN
Qty: 15 TABLET | Refills: 0 | Status: SHIPPED | OUTPATIENT
Start: 2017-06-02

## 2017-06-02 RX ORDER — DOCUSATE SODIUM 100 MG/1
100 CAPSULE, LIQUID FILLED ORAL 2 TIMES DAILY PRN
Qty: 60 CAPSULE | Refills: 1 | Status: SHIPPED | OUTPATIENT
Start: 2017-06-02

## 2017-06-02 RX ORDER — IBUPROFEN 600 MG/1
600 TABLET ORAL EVERY 6 HOURS PRN
Qty: 60 TABLET | Refills: 0 | Status: SHIPPED | OUTPATIENT
Start: 2017-06-02

## 2017-06-02 RX ADMIN — IBUPROFEN 600 MG: 600 TABLET, FILM COATED ORAL at 11:06

## 2017-06-02 NOTE — PROGRESS NOTES
"[facility]    Obstetrics & Gynecology  Progress Note      Patient Name:  Iliana Ghotra  MRN:  [id number]  Admission Date:  5/31/2017  Hospital Length of Stay:  2  Attending Physician:  Andriy Bhatt MD    Subjective:     Date:  06/02/2017    Principal Problem:  Fetal demise before 20 weeks with retention of dead fetus    Hospital Course:  Post Partum Day # 1 - s/p vaginal delivery at 17w0d    Patient without major complaints  No acute events overnight  Denies headaches, vision changes, epigastric pain, SOB, CP  Pt reports "doing ok" with good family   Pain well controlled  Lochia less than menses  Breast non-tender, non-engorged  Ambulating, tolerating regular diet, and voiding without diffculty    Objective:     Temp:  [98.2 °F (36.8 °C)-98.9 °F (37.2 °C)] 98.9 °F (37.2 °C)  Pulse:  [66-72] 70  Resp:  [14-18] 14  BP: ()/(50-58) 90/50    BP (!) 90/50   Pulse 70   Temp 98.9 °F (37.2 °C)   Resp 14   Ht 5' (1.524 m)   Wt 64.9 kg (143 lb)   LMP 01/23/2017   SpO2 100%   Breastfeeding? No   BMI 27.93 kg/m²     Clear, marciano urine    GENERAL:  No acute distress. Alert and oriented x 3.   HEENT:  No scleral icterus. Neck supple. Moist mucus membranes.   LUNGS:  Clear to auscultation bilaterally.   HEART:  Regular rate and rhythm with physiologic heart sounds.   ABDOMEN:  Soft, non-tender, non-distended, no guarding, rigidity, or rebound with normoactive bowel sounds.   FUNDUS:  Firm, nontender below the umbilicus.   EXTREMITIES:  No cramping, claudication or edema LE. +2 distal pulses. Symmetric, full range of motion, negative Barrientos.  NEUROLOGIC:  Grossly intact bilaterally. +2 DTR's.   PSYCH:  Mood and affect appropriate. Denies depression, suicidal or homicidal ideations.  PERINEUM:  Intact with light lochia.    Labs:      Recent Results (from the past 336 hour(s))   CBC auto differential    Collection Time: 06/02/17  6:13 AM   Result Value Ref Range    WBC 10.90 3.90 - 12.70 K/uL    " Hemoglobin 10.3 (L) 12.0 - 16.0 g/dL    Hematocrit 30.5 (L) 37.0 - 48.5 %    Platelets 263 150 - 350 K/uL   CBC with Auto Differential    Collection Time: 05/31/17  1:08 PM   Result Value Ref Range    WBC 11.36 3.90 - 12.70 K/uL    Hemoglobin 13.2 12.0 - 16.0 g/dL    Hematocrit 38.1 37.0 - 48.5 %    Platelets 264 150 - 350 K/uL     ABO:  A POS    Assessment:     Post-partum day # 1 - Fetal demise at 17w0d s/p spontaneous vaginal delivery - progressing well    Plan:     Plan for discharge today.     Reviewed discharge medications.  Pt was instructed to avoid driving or operate heavy machinery while taking narcotics or other medications with sedative properties.    Post-partum care instructions and precautions, clinical/delivery findings, and hospital course reviewed with pt prior to discharge.  All of her questions were answered to her satisfaction.  Pt voiced understanding and agrees with discharge.    Return to clinic in 6 weeks for post-partum visit or sooner if any concerns.  Go to ER for any emergencies.      Andriy Bhatt MD

## 2017-06-02 NOTE — NURSING
Written discharge instructions provided and reviewed.Discharged home in stable cond.with significant other in attendance.

## 2017-06-02 NOTE — DISCHARGE SUMMARY
[facility]    Discharge Summary  Obstetrics & Gynecology      Patient Name:  Iliana Ghotra  MRN:  [id number]  Admission Date:  2017  Discharge Date:  2017  Hospital Length of Stay:  2  Attending Physician:  Andriy Bhatt MD  Discharging Physician:  Andriy Bhatt MD  Date of Delivery:  2017    Admitting Diagnosis:       Fetal demise at 16w6d  Induction of labor    Discharge Diagnosis:    Same    Procedure performed:      Spontanous vaginal delivery    Brief Hospital Course:      Please see H&P for complete details.  Iliana Ghotra is a 22 y.o.  at 16w6d who admitted to L&D for induction of labor secondary to fetal demise.  Induction was initiated with cytotec x 2 doses, and and pt delivered ~8 hours.  See delivery note for further details.  Pt declined all fetal testing.  Following delivery, the patient was transfer to the floor for routine post-partum care.  Pt had a fairly uncomplicated postpartum course.  Prior to discharge, she was without major complaints.  Pt pain was well controlled.  Pt was ambulating, tolerating a regular diet, voiding without difficulty.  Pt lochia was light.  Vital signs where physiologic and stable.  Physical exam showed no acute findings.  Pt had satisfied routine postpartum discharge criteria and expressed the desire to be discharge from the hospital.  Again, heartfelt condolences were expressed to family.  Pt reports good family support and was hopeful for the future.     Pertinent Labs or Studies:      Recent Results (from the past 336 hour(s))   CBC auto differential    Collection Time: 17  6:13 AM   Result Value Ref Range    WBC 10.90 3.90 - 12.70 K/uL    Hemoglobin 10.3 (L) 12.0 - 16.0 g/dL    Hematocrit 30.5 (L) 37.0 - 48.5 %    Platelets 263 150 - 350 K/uL   CBC with Auto Differential    Collection Time: 17  1:08 PM   Result Value Ref Range    WBC 11.36 3.90 - 12.70 K/uL    Hemoglobin 13.2 12.0 - 16.0 g/dL    Hematocrit 38.1  37.0 - 48.5 %    Platelets 264 150 - 350 K/uL     A POS    Discharge Exam:      Temp:  [98.2 °F (36.8 °C)-98.9 °F (37.2 °C)] 98.9 °F (37.2 °C)  Pulse:  [66-72] 70  Resp:  [14-18] 14  BP: ()/(50-58) 90/50    BP (!) 90/50   Pulse 70   Temp 98.9 °F (37.2 °C)   Resp 14   Ht 5' (1.524 m)   Wt 64.9 kg (143 lb)   LMP 01/23/2017   SpO2 100%   Breastfeeding? No   BMI 27.93 kg/m²     GENERAL:  No acute distress. Alert and oriented x 3.   HEENT:  Normocephalic. EOMI, PERRLA. No scleral icterus. Neck supple. Moist mucus membranes.   LUNGS:  Clear to auscultation bilaterally.   HEART:  Regular rate and rhythm with physiologic heart sounds.   ABDOMEN:  Soft, non-tender, non-distended, no guarding, rigidity, or rebound.   FUNDUS:  Firm, nontender below the umbilicus.   EXTREMITIES:  No cramping, claudication or edema. Good skin turgor. +2 distal pulses, symmetric. Full range of motion.   NEUROLOGIC:  Grossly intact bilaterally.   PSYCH:  Mood and affect appropriate.   PERINEUM:  Intact with light lochia.    Discharge Condition:  Stable      Discharge Disposition:  Home       Discharge Medications:     Resume prenatal vitamins 1 tab po qday  Fergon 325 mg 1 tab po qday, disp #60, refill 1  Colace 100 mg 1 tab po bid prn constipation, disp #60, refill 1  Motrin 600 mg 1 tab po q6h prn pain, disp #60, refill 0  Percocet 5/325 mg 1 tab po q4-6h prn breakthrough pain, disp #30, refill 0    Discharge Activites:      Resume activities as tolerated with adequate rest  Pelvic rest for 6 weeks   No heavy lifting greater 10 lbs or strenuous activities   Showers only  Avoid driving and operating machinery while taking narcotics or other sedative medications.  Patient voiced understanding.     Discharge Diet:  Regular diet    Discharge Instructions:      Pt was instructed to contact the clinic or emergency department for any concerns including but not limited to an increase in her lochia, abdominal pain, foul vaginal discharge,  weakness, decrease activity level, decrease appetite, feeling sad or hopeless, breast pain or infection, difficulty with voiding or having bowel movements, perineal pain or breakdown, wound breakdown, fever >100.4 or abnormal vital signs, shortness of breath, chest pain, dizziness or feeling faint, pain or swelling in her extremities, headaches not relieved with rest and Tylenol, vision changes, seizure activities, abnormal bleeding/bruising, or any other health concerns.  Post-partum care instructions and precautions, labs/studies, clinical/delivery findings, and hospital course reviewed with the patient prior to discharge.  All of her questions were answered to her satisfaction.  Pt voiced understanding.      Follow-up Visit:  6 weeks for postpartum visit, or sooner if any problems.       Andriy Bhatt MD

## 2017-06-04 ENCOUNTER — NURSE TRIAGE (OUTPATIENT)
Dept: ADMINISTRATIVE | Facility: CLINIC | Age: 22
End: 2017-06-04

## 2017-06-04 ENCOUNTER — HOSPITAL ENCOUNTER (EMERGENCY)
Facility: HOSPITAL | Age: 22
Discharge: HOME | End: 2017-06-04
Attending: EMERGENCY MEDICINE
Payer: MEDICAID

## 2017-06-04 VITALS
DIASTOLIC BLOOD PRESSURE: 74 MMHG | WEIGHT: 143 LBS | SYSTOLIC BLOOD PRESSURE: 118 MMHG | HEIGHT: 60 IN | HEART RATE: 79 BPM | BODY MASS INDEX: 28.07 KG/M2 | TEMPERATURE: 99 F | RESPIRATION RATE: 16 BRPM | OXYGEN SATURATION: 100 %

## 2017-06-04 DIAGNOSIS — M54.50 LOW BACK PAIN WITHOUT SCIATICA, UNSPECIFIED BACK PAIN LATERALITY, UNSPECIFIED CHRONICITY: Primary | ICD-10-CM

## 2017-06-04 LAB
B-HCG UR QL: POSITIVE
BACTERIA GENITAL QL WET PREP: ABNORMAL
BILIRUB UR QL STRIP: NEGATIVE
CLARITY UR: CLEAR
CLUE CELLS VAG QL WET PREP: ABNORMAL
COLOR UR: ABNORMAL
CTP QC/QA: YES
FILAMENT FUNGI VAG WET PREP-#/AREA: ABNORMAL
GLUCOSE UR QL STRIP: NEGATIVE
HGB UR QL STRIP: ABNORMAL
KETONES UR QL STRIP: NEGATIVE
LEUKOCYTE ESTERASE UR QL STRIP: ABNORMAL
MICROSCOPIC COMMENT: NORMAL
NITRITE UR QL STRIP: NEGATIVE
PH UR STRIP: 6 [PH] (ref 5–8)
PROT UR QL STRIP: NEGATIVE
RBC #/AREA URNS HPF: 3 /HPF (ref 0–4)
SP GR UR STRIP: 1.01 (ref 1–1.03)
SPECIMEN SOURCE: ABNORMAL
T VAGINALIS GENITAL QL WET PREP: ABNORMAL
URN SPEC COLLECT METH UR: ABNORMAL
UROBILINOGEN UR STRIP-ACNC: NEGATIVE EU/DL
WBC #/AREA URNS HPF: 5 /HPF (ref 0–5)
WBC #/AREA VAG WET PREP: ABNORMAL
YEAST GENITAL QL WET PREP: ABNORMAL

## 2017-06-04 PROCEDURE — 81025 URINE PREGNANCY TEST: CPT | Performed by: EMERGENCY MEDICINE

## 2017-06-04 PROCEDURE — 87591 N.GONORRHOEAE DNA AMP PROB: CPT

## 2017-06-04 PROCEDURE — 25000003 PHARM REV CODE 250: Performed by: PHYSICIAN ASSISTANT

## 2017-06-04 PROCEDURE — 87210 SMEAR WET MOUNT SALINE/INK: CPT

## 2017-06-04 PROCEDURE — 81000 URINALYSIS NONAUTO W/SCOPE: CPT

## 2017-06-04 PROCEDURE — 99284 EMERGENCY DEPT VISIT MOD MDM: CPT

## 2017-06-04 RX ORDER — OXYCODONE AND ACETAMINOPHEN 10; 325 MG/1; MG/1
1 TABLET ORAL
Status: COMPLETED | OUTPATIENT
Start: 2017-06-04 | End: 2017-06-04

## 2017-06-04 RX ADMIN — OXYCODONE HYDROCHLORIDE AND ACETAMINOPHEN 1 TABLET: 10; 325 TABLET ORAL at 02:06

## 2017-06-04 NOTE — TELEPHONE ENCOUNTER
Reason for Disposition   Unable to walk    Protocols used: ST BACK PAIN-A-AH    Patient complaint of back pain 10/10 not be alleviated by the pain medications she was prescribed after delivery. Also, she has bilateral leg pains that are severe. Patient denies picking up heavy objects and she has been walking around and not doing anything strenuous. Patient advised to go to the ED and she verbalized understanding.

## 2017-06-04 NOTE — DISCHARGE INSTRUCTIONS
Please continue taking Percocet as previous prescribed every 4 hours while continuing to have pain.  Follow-up with Dr. Bhatt as already scheduled.

## 2017-06-04 NOTE — ED PROVIDER NOTES
Encounter Date: 6/4/2017       History     Chief Complaint   Patient presents with    Back Pain     Low back pain since spontaneous AB 2 days ago. Did not have D&C. Still having light vaginal bleeding.     Review of patient's allergies indicates:  No Known Allergies  CC: Vaginal Bleeding, Back Pain   HPI: This 22 y.o. female smoker presents to the ED c/o ongoing vaginal bleeding, severe low abdominal cramping, and severe low back pain s/p miscarriage 3 days ago. Pt states that the bleeding has mildly worsened since the miscarriage. She states that the Percocet she was prescribed has not been helping; no doses taken today. She denies fever, chest pain, SOB, N/V/D.        The history is provided by the patient.     History reviewed. No pertinent past medical history.  History reviewed. No pertinent surgical history.  Family History   Problem Relation Age of Onset    Cancer Mother      Social History   Substance Use Topics    Smoking status: Current Some Day Smoker     Packs/day: 1.00     Types: Cigarettes    Smokeless tobacco: Not on file    Alcohol use No      Comment: prior to pregnancy     Review of Systems   Constitutional: Negative for chills and fever.   HENT: Negative for ear pain and sore throat.    Eyes: Negative for pain and visual disturbance.   Respiratory: Negative for cough and shortness of breath.    Gastrointestinal: Positive for abdominal pain. Negative for diarrhea, nausea and vomiting.   Genitourinary: Positive for vaginal bleeding. Negative for dysuria and flank pain.   Musculoskeletal: Positive for back pain.   Skin: Negative for rash and wound.   Neurological: Negative for syncope and headaches.       Physical Exam     Initial Vitals [06/04/17 1257]   BP Pulse Resp Temp SpO2   118/74 79 16 98.5 °F (36.9 °C) 100 %     Physical Exam    Vitals reviewed.  Constitutional: She appears well-developed and well-nourished. She is not diaphoretic. No distress.   HENT:   Head: Normocephalic and  atraumatic.   Right Ear: External ear normal.   Left Ear: External ear normal.   Nose: Nose normal.   Eyes: Conjunctivae are normal. No scleral icterus.   Neck: Normal range of motion. Neck supple.   Cardiovascular: Normal rate, regular rhythm, normal heart sounds and intact distal pulses.   Pulmonary/Chest: Breath sounds normal. No respiratory distress. She has no wheezes. She has no rhonchi. She has no rales. She exhibits no tenderness.   Abdominal: Soft. Bowel sounds are normal. She exhibits no distension and no mass. There is no tenderness. There is no rebound and no guarding.   Genitourinary: Cervix exhibits no motion tenderness, no discharge and no friability. Right adnexum displays no mass and no tenderness. Left adnexum displays no mass and no tenderness. There is bleeding (scant) in the vagina. No tenderness in the vagina. No signs of injury around the vagina. No vaginal discharge found.   Musculoskeletal: Normal range of motion. She exhibits tenderness (mild lumbar paraspinal tenderness bilaterally).   Lymphadenopathy:     She has no cervical adenopathy.   Neurological: She is alert and oriented to person, place, and time.   Skin: Skin is warm and dry.         ED Course   Procedures  Labs Reviewed   URINALYSIS - Abnormal; Notable for the following:        Result Value    Occult Blood UA 2+ (*)     Leukocytes, UA 1+ (*)     All other components within normal limits   VAGINAL SCREEN - Abnormal; Notable for the following:     Clue Cells, Wet Prep Few (*)     WBC - Vaginal Screen Rare (*)     Bacteria - Vaginal Screen Moderate (*)     All other components within normal limits   POCT URINE PREGNANCY - Abnormal; Notable for the following:     POC Preg Test, Ur Positive (*)     All other components within normal limits   C. TRACHOMATIS/N. GONORRHOEAE BY AMP DNA   URINALYSIS MICROSCOPIC             Medical Decision Making:   Initial Assessment:   22-year-old female with recent fetal demise and spontaneous vaginal  delivery with induction 2 days ago presents with lower back pain and vaginal bleeding.  Patient was a proximally 17 weeks gestation at time of delivery.  OB/GYN discharge patient home with ibuprofen and Percocet.  Patient has not taken either pain medication since yesterday afternoon and complains of lower back pain today.  Her vaginal bleeding has increased slightly over the last 2 days.  She denies fever, dysuria, significant vaginal discharge.  Differential Diagnosis:   Musculoskeletal pain, endometritis, PID, cystitis, pyelonephritis, nephrolithiasis.  ED Management:  Patient presents well-appearing, in no distress, and no obvious discomfort.  She is afebrile with vitals within normal limits.  Abdomen soft and nontender.  No CVA tenderness.  Patient has mild tenderness to bilateral lumbar paraspinal muscles.  No midline tenderness.  Pelvic exam remarkable for scant amount of blood in the vault.  No CMT.  No significant discharge.  UA with no evidence of infection.  Vaginal screen with rare clue cells.  I doubt significant or treatable BV.  I also have low suspicion for PID or cervicitis.  Will follow up gonorrhea and chlamydia.  Patient overall well-appearing and stable for discharge with OB/GYN follow-up.  I do not suspect significant infection such as endometritis or sepsis.  Patient treated with Percocet in the ED and advised to continue taking her home Percocet as previously prescribed and to call OB/GYN for follow-up evaluation.  Patient verbalized understanding and agreed with plan.  Case discussed with Dr. Baker.            Scribe Attestation:   Scribe #1: I performed the above scribed service and the documentation accurately describes the services I performed. I attest to the accuracy of the note.    Attending Attestation:           Physician Attestation for Scribe:  Physician Attestation Statement for Scribe #1: I, Zack Toro PA-C, reviewed documentation, as scribed by Fausto Pop in my presence,  and it is both accurate and complete.                 ED Course     Clinical Impression:   The encounter diagnosis was Low back pain without sciatica, unspecified back pain laterality, unspecified chronicity.          Zack Toro PA-C  06/04/17 1917

## 2017-06-05 LAB
C TRACH DNA SPEC QL NAA+PROBE: NOT DETECTED
N GONORRHOEA DNA SPEC QL NAA+PROBE: NOT DETECTED

## 2017-06-21 ENCOUNTER — TELEPHONE (OUTPATIENT)
Dept: OBSTETRICS AND GYNECOLOGY | Facility: CLINIC | Age: 22
End: 2017-06-21

## 2017-06-21 NOTE — TELEPHONE ENCOUNTER
Called and was able to spoke with pt regarding bleeding issue.  Pt states that she had a vaginal delivery of a stillbirth on 06/01/17 and was concerned of passing big clots of blood.  Since it's her first pregnancy, she was not informed that this may occur after delivery.  Pt was reassured that it is normal and bleeding may take up to 6 weeks after delivery. Pt was instructed to take Ibuprofen 600 mg if needed for pain.  An appt for her 6 wks pp check up has been scheduled and will be sent to her.  Pt voiced understanding and will call office when needed. hira

## 2017-06-21 NOTE — TELEPHONE ENCOUNTER
----- Message from Ximena Huggins sent at 6/21/2017  1:30 PM CDT -----  Contact: self  Patient called stating that she had miscarriage and is passing large blood clots. Please contact her at [phone].    Thanks!

## 2017-07-14 ENCOUNTER — TELEPHONE (OUTPATIENT)
Dept: OBSTETRICS AND GYNECOLOGY | Facility: CLINIC | Age: 22
End: 2017-07-14

## 2017-07-14 NOTE — TELEPHONE ENCOUNTER
Tried contacting the pt to reschedule apt missed on 7/13, no answer LM on VM to call the office to reschedule. kt  
Verbalized Understanding/Simple: Patient demonstrates quick and easy understanding

## 2017-07-28 ENCOUNTER — TELEPHONE (OUTPATIENT)
Dept: OBSTETRICS AND GYNECOLOGY | Facility: CLINIC | Age: 22
End: 2017-07-28

## 2017-07-28 NOTE — TELEPHONE ENCOUNTER
----- Message from Yasmine Olivera sent at 7/28/2017  9:34 AM CDT -----  Contact: self  Pt calling to state that she is still bleeding since she has had her miscarriage 8 weeks ago. Please call pt to discuss to [phone].  --------------------------------------------------------  7/28/17 @ 2904D ([facility])  RETURNED CALL TO [place] [facility]ER, SHE STATED SHE IS OUT OF TOWN AND IS SEEING ANOTHER DR WHERE SHE IS , SHE HAS AN APPT WITH THAT PHYSICIAN ON 8/1/17, SHE STATED SHE IS STILL BLEEDING POST HER MISCARRIAGE 8 WKS AGO, AND WANTS TO KNOW IF SHE SHOULD GO TO THE E.R. , INFORMED HER THAT SINCE SHE IS UNDER THE CARE OF ANOTHER PHYSICIAN SHE NEEDS TO CALL THEIR OFFICE AND THEY NEED TO ADVISE HER ON HOW TO HANDLE THE SITUATION SINCE SHE IS IN A DIFFERENT STATED AND IS NO LONGER UNDER THE CARE OF DR MONSON, PT STATED HER UNDERSTANDING

## 2024-06-11 NOTE — TREATMENT PLAN
Prepped: right chest. Prepped with: ChloraPrep. Pt called out with increased pain.  SVE - fetal parts felt in vaginal vault.  Mod amount of bloody fluid expelled upon exam.